# Patient Record
Sex: MALE | Race: WHITE
[De-identification: names, ages, dates, MRNs, and addresses within clinical notes are randomized per-mention and may not be internally consistent; named-entity substitution may affect disease eponyms.]

---

## 2023-08-09 ENCOUNTER — HOSPITAL ENCOUNTER (OUTPATIENT)
Dept: HOSPITAL 33 - ED | Age: 53
Setting detail: OBSERVATION
LOS: 2 days | Discharge: HOME | End: 2023-08-11
Attending: INTERNAL MEDICINE | Admitting: INTERNAL MEDICINE
Payer: COMMERCIAL

## 2023-08-09 DIAGNOSIS — R74.01: ICD-10-CM

## 2023-08-09 DIAGNOSIS — E87.6: ICD-10-CM

## 2023-08-09 DIAGNOSIS — R11.2: ICD-10-CM

## 2023-08-09 DIAGNOSIS — Z20.828: ICD-10-CM

## 2023-08-09 DIAGNOSIS — F32.A: ICD-10-CM

## 2023-08-09 DIAGNOSIS — F10.939: Primary | ICD-10-CM

## 2023-08-09 LAB
ALBUMIN SERPL-MCNC: 4.2 G/DL (ref 3.5–5)
ALP SERPL-CCNC: 105 U/L (ref 38–126)
ALT SERPL-CCNC: 258 U/L (ref 0–50)
AMPHETAMINES UR QL: NEGATIVE
ANION GAP SERPL CALC-SCNC: 15.8 MEQ/L (ref 5–15)
AST SERPL QL: 270 U/L (ref 17–59)
BACTERIA UR CULT: YES
BARBITURATES UR QL: NEGATIVE
BASOPHILS # BLD AUTO: 0.03 X10^3/UL (ref 0–0.4)
BASOPHILS NFR BLD AUTO: 0.3 % (ref 0–0.4)
BENZODIAZ UR QL SCN: NEGATIVE
BILIRUB BLD-MCNC: 1.6 MG/DL (ref 0.2–1.3)
BUN SERPL-MCNC: 11 MG/DL (ref 9–20)
CALCIUM SPEC-MCNC: 8.5 MG/DL (ref 8.4–10.2)
CHLORIDE SERPL-SCNC: 96 MMOL/L (ref 98–107)
CO2 SERPL-SCNC: 29 MMOL/L (ref 22–30)
COCAINE UR QL SCN: NEGATIVE
CREAT SERPL-MCNC: 1.01 MG/DL (ref 0.66–1.25)
EOSINOPHIL # BLD AUTO: 0 X10^3/UL (ref 0–0.5)
ETHANOL SERPL-MCNC: < 10 MG/DL (ref 0–10)
GFR SERPLBLD BASED ON 1.73 SQ M-ARVRAT: > 60 ML/MIN
GLUCOSE SERPL-MCNC: 159 MG/DL (ref 74–106)
HCT VFR BLD AUTO: 48.6 % (ref 42–50)
HGB BLD-MCNC: 17.2 G/DL (ref 12.5–18)
IMM GRANULOCYTES # BLD: 0.03 X10^3U/L (ref 0–0.03)
IMM GRANULOCYTES NFR BLD: 0.3 % (ref 0–0.4)
LIPASE SERPL-CCNC: 96 U/L (ref 23–300)
LYMPHOCYTES # SPEC AUTO: 1.73 X10^3/UL (ref 1–4.6)
MCH RBC QN AUTO: 32.4 PG (ref 26–32)
MCHC RBC AUTO-ENTMCNC: 35.4 G/DL (ref 32–36)
METHADONE UR QL: NEGATIVE
MONOCYTES # BLD AUTO: 1.34 X10^3/UL (ref 0–1.3)
NRBC # BLD AUTO: 0 X10^3U/L (ref 0–0.01)
NRBC BLD AUTO-RTO: 0 % (ref 0–0.1)
OPIATES UR QL: NEGATIVE
PCP UR QL CFM>20 NG/ML: NEGATIVE
PLATELET # BLD AUTO: 212 X10^3/UL (ref 150–450)
POTASSIUM SERPLBLD-SCNC: 3.2 MMOL/L (ref 3.5–5.1)
PROT SERPL-MCNC: 7.6 G/DL (ref 6.3–8.2)
PROT UR STRIP-MCNC: 100 MG/DL
RBC # BLD AUTO: 5.31 X10^6/UL (ref 4.1–5.6)
RBC # URNS HPF: (no result) /HPF (ref 0–5)
SODIUM SERPL-SCNC: 137 MMOL/L (ref 137–145)
THC UR QL SCN: POSITIVE
WBC # BLD AUTO: 10 X10^3/UL (ref 4–10.5)
WBC URNS QL MICRO: (no result) /HPF (ref 0–5)

## 2023-08-09 PROCEDURE — 81001 URINALYSIS AUTO W/SCOPE: CPT

## 2023-08-09 PROCEDURE — 96374 THER/PROPH/DIAG INJ IV PUSH: CPT

## 2023-08-09 PROCEDURE — 96375 TX/PRO/DX INJ NEW DRUG ADDON: CPT

## 2023-08-09 PROCEDURE — 36415 COLL VENOUS BLD VENIPUNCTURE: CPT

## 2023-08-09 PROCEDURE — 84484 ASSAY OF TROPONIN QUANT: CPT

## 2023-08-09 PROCEDURE — 76705 ECHO EXAM OF ABDOMEN: CPT

## 2023-08-09 PROCEDURE — 85025 COMPLETE CBC W/AUTO DIFF WBC: CPT

## 2023-08-09 PROCEDURE — 36000 PLACE NEEDLE IN VEIN: CPT

## 2023-08-09 PROCEDURE — 87086 URINE CULTURE/COLONY COUNT: CPT

## 2023-08-09 PROCEDURE — 87045 FECES CULTURE AEROBIC BACT: CPT

## 2023-08-09 PROCEDURE — 94760 N-INVAS EAR/PLS OXIMETRY 1: CPT

## 2023-08-09 PROCEDURE — 96361 HYDRATE IV INFUSION ADD-ON: CPT

## 2023-08-09 PROCEDURE — G0378 HOSPITAL OBSERVATION PER HR: HCPCS

## 2023-08-09 PROCEDURE — 87046 STOOL CULTR AEROBIC BACT EA: CPT

## 2023-08-09 PROCEDURE — 80053 COMPREHEN METABOLIC PANEL: CPT

## 2023-08-09 PROCEDURE — 94762 N-INVAS EAR/PLS OXIMTRY CONT: CPT

## 2023-08-09 PROCEDURE — 96360 HYDRATION IV INFUSION INIT: CPT

## 2023-08-09 PROCEDURE — 80307 DRUG TEST PRSMV CHEM ANLYZR: CPT

## 2023-08-09 PROCEDURE — 93041 RHYTHM ECG TRACING: CPT

## 2023-08-09 PROCEDURE — 96365 THER/PROPH/DIAG IV INF INIT: CPT

## 2023-08-09 PROCEDURE — 96368 THER/DIAG CONCURRENT INF: CPT

## 2023-08-09 PROCEDURE — 99285 EMERGENCY DEPT VISIT HI MDM: CPT

## 2023-08-09 PROCEDURE — 83690 ASSAY OF LIPASE: CPT

## 2023-08-09 PROCEDURE — 96366 THER/PROPH/DIAG IV INF ADDON: CPT

## 2023-08-09 PROCEDURE — 82077 ASSAY SPEC XCP UR&BREATH IA: CPT

## 2023-08-09 PROCEDURE — 74176 CT ABD & PELVIS W/O CONTRAST: CPT

## 2023-08-09 PROCEDURE — 83735 ASSAY OF MAGNESIUM: CPT

## 2023-08-09 RX ADMIN — MAGNESIUM SULFATE IN DEXTROSE SCH MLS/HR: 10 INJECTION, SOLUTION INTRAVENOUS at 22:37

## 2023-08-09 RX ADMIN — MAGNESIUM SULFATE IN DEXTROSE SCH MLS/HR: 10 INJECTION, SOLUTION INTRAVENOUS at 21:41

## 2023-08-09 RX ADMIN — POTASSIUM CHLORIDE SCH MLS/HR: 14.9 INJECTION, SOLUTION INTRAVENOUS at 21:41
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
## 2023-08-10 LAB
ALBUMIN SERPL-MCNC: 3 G/DL (ref 3.5–5)
ALP SERPL-CCNC: 78 U/L (ref 38–126)
ALT SERPL-CCNC: 190 U/L (ref 0–50)
ANION GAP SERPL CALC-SCNC: 8.2 MEQ/L (ref 5–15)
AST SERPL QL: 158 U/L (ref 17–59)
BASOPHILS # BLD AUTO: 0.04 X10^3/UL (ref 0–0.4)
BASOPHILS NFR BLD AUTO: 0.5 % (ref 0–0.4)
BILIRUB BLD-MCNC: 1.8 MG/DL (ref 0.2–1.3)
BUN SERPL-MCNC: 9 MG/DL (ref 9–20)
CALCIUM SPEC-MCNC: 7.8 MG/DL (ref 8.4–10.2)
CHLORIDE SERPL-SCNC: 100 MMOL/L (ref 98–107)
CO2 SERPL-SCNC: 31 MMOL/L (ref 22–30)
CREAT SERPL-MCNC: 1.04 MG/DL (ref 0.66–1.25)
EOSINOPHIL # BLD AUTO: 0.07 X10^3/UL (ref 0–0.5)
GFR SERPLBLD BASED ON 1.73 SQ M-ARVRAT: > 60 ML/MIN
GLUCOSE SERPL-MCNC: 109 MG/DL (ref 74–106)
HCT VFR BLD AUTO: 43 % (ref 42–50)
HGB BLD-MCNC: 14.6 G/DL (ref 12.5–18)
IMM GRANULOCYTES # BLD: 0.02 X10^3U/L (ref 0–0.03)
IMM GRANULOCYTES NFR BLD: 0.3 % (ref 0–0.4)
LYMPHOCYTES # SPEC AUTO: 2.32 X10^3/UL (ref 1–4.6)
MAGNESIUM SERPL-MCNC: 2 MG/DL (ref 1.6–2.3)
MCH RBC QN AUTO: 32.2 PG (ref 26–32)
MCHC RBC AUTO-ENTMCNC: 34 G/DL (ref 32–36)
MONOCYTES # BLD AUTO: 1.25 X10^3/UL (ref 0–1.3)
NRBC # BLD AUTO: 0 X10^3U/L (ref 0–0.01)
NRBC BLD AUTO-RTO: 0 % (ref 0–0.1)
PLATELET # BLD AUTO: 168 X10^3/UL (ref 150–450)
POTASSIUM SERPLBLD-SCNC: 3.5 MMOL/L (ref 3.5–5.1)
PROT SERPL-MCNC: 5.9 G/DL (ref 6.3–8.2)
RBC # BLD AUTO: 4.53 X10^6/UL (ref 4.1–5.6)
SODIUM SERPL-SCNC: 136 MMOL/L (ref 137–145)
WBC # BLD AUTO: 7.9 X10^3/UL (ref 4–10.5)

## 2023-08-10 RX ADMIN — LORAZEPAM PRN MG: 2 INJECTION, SOLUTION INTRAMUSCULAR; INTRAVENOUS at 07:25

## 2023-08-10 RX ADMIN — LORAZEPAM PRN MG: 2 INJECTION, SOLUTION INTRAMUSCULAR; INTRAVENOUS at 20:27

## 2023-08-10 RX ADMIN — LORAZEPAM PRN MG: 2 INJECTION, SOLUTION INTRAMUSCULAR; INTRAVENOUS at 13:46

## 2023-08-10 RX ADMIN — PANTOPRAZOLE SODIUM SCH MG: 40 TABLET, DELAYED RELEASE ORAL at 09:45

## 2023-08-10 RX ADMIN — FOLIC ACID SCH MG: 1 TABLET ORAL at 09:45

## 2023-08-10 RX ADMIN — ENOXAPARIN SODIUM SCH: 100 INJECTION SUBCUTANEOUS at 11:40

## 2023-08-10 RX ADMIN — LORAZEPAM PRN MG: 2 INJECTION, SOLUTION INTRAMUSCULAR; INTRAVENOUS at 16:05

## 2023-08-10 RX ADMIN — LORAZEPAM PRN MG: 2 INJECTION, SOLUTION INTRAMUSCULAR; INTRAVENOUS at 18:14

## 2023-08-10 RX ADMIN — THERA TABS SCH TAB: TAB at 09:45

## 2023-08-10 RX ADMIN — POTASSIUM CHLORIDE SCH MLS/HR: 14.9 INJECTION, SOLUTION INTRAVENOUS at 00:28

## 2023-08-10 RX ADMIN — Medication SCH MG: at 09:45

## 2023-08-10 RX ADMIN — LORAZEPAM PRN MG: 2 INJECTION, SOLUTION INTRAMUSCULAR; INTRAVENOUS at 00:44

## 2023-08-10 NOTE — XRAY
Indication: Abdomen pain and vomiting.



Multiple contiguous axial images obtained through the abdomen and pelvis

without contrast.



Comparison: None



Lung bases clear.  Heart not enlarged.



Noncontrasted stomach and bowel loops appear nonobstructed with normal

appendix.  Colonic diarrhea in sigmoid/rectum.  Gallbladder mildly distended

without obvious gallstones or biliary distention.  Nonobstructing punctate

calculus in each kidney.  No free fluid/air.  Incidental hepatic/splenic

calcified granulomas.



Remaining liver, gallbladder, pancreas, spleen, adrenal glands, kidneys,

ureters, bladder, and aorta are unremarkable for noncontrast exam.



Osseous structures intact with lumbosacral junction degenerative disc disease.



Impression:

1.  Mildly distended gallbladder.  Sonogram may yield further information if

clinically warranted.

2.  Nonobstructing punctate renal calculus bilaterally.

3.  Incidental old granulomatous disease.

## 2023-08-10 NOTE — PCM.NOTE
Date and Time: 08/10/23  1643





Subjective Assessment: 


 is a 53 year old male who presentd to ED with concerns of alcohol 

withdrawal. Over the past five years he has been intermittently binge drinking 

(drinks a fifth of liquor daily), with most recent binging being over the last 

few weeks. His last drink was at approximately 18:00 on the night prior to 

presenting. Since then, he has had nausea and nonbilious nonbloody emesis x 20 

episodes daily and generalized abdominal pain. He also has experienced nonbloody

diarrhea. He is interested in permanently quitting alcohol abuse. Upon interview

today patient without tremors or notable withdrawal symptoms. He states that he 

is feeling better today although he still is having loose stools and one episode

of vomiting. He is interested in outpatient treatment at [facility] for his

alcohol abuse.








- Review of Systems


Constitutional: No Symptoms


Eyes: No Symptoms


Ears, Nose, & Throat: No Symptoms


Respiratory: No Symptoms


Cardiac: No Symptoms


Abdominal/Gastrointestinal: Nausea, Vomiting, Diarrhea


Genitourinary Symptoms: No Symptoms


Musculoskeletal: No Symptoms


Skin: No Symptoms


Neurological: No Symptoms


Psychological: Alcohol Abuse, Depression





Objective Exam


General Appearance: no apparent distress


Neurologic Exam: alert, oriented x 3, cooperative, normal mood/affect


Skin Exam: normal color


Eye Exam: PERRL


Respiratory Exam: normal breath sounds


Cardiovascular Exam: regular rate/rhythm, normal heart sounds


Gastrointestinal/Abdomen Exam: soft, other (hyperacitve bowel sounds x 4 quads)


Extremity Exam: normal inspection





OBJECTIVE DATA


Vital Signs: 





                               Vital Signs - 24 hr











  Temp Pulse Resp BP Pulse Ox


 


 08/10/23 16:00    21  


 


 08/10/23 11:54  97.0 F  59 L  16  163/88  97


 


 08/10/23 11:43   72  18  


 


 08/10/23 07:20  97.9 F  84  18  141/91  95


 


 08/10/23 07:05      95


 


 08/10/23 06:03   59 L  18   97


 


 08/10/23 03:48  98 F  57 L  19  139/96  97


 


 08/10/23 03:43   57 L  19  


 


 08/10/23 03:08   57 L   


 


 08/09/23 23:56  98.0 F  60  16  124/97  97


 


 08/09/23 23:32      97


 


 08/09/23 23:00   79  18  130/82  97


 


 08/09/23 22:00   68  18  136/93  98


 


 08/09/23 21:29      97


 


 08/09/23 21:00   73  18  140/84  97


 


 08/09/23 20:00   78  18  138/88  97


 


 08/09/23 19:54      98


 


 08/09/23 19:00   104 H  22  135/97  98


 


 08/09/23 18:25  98.3 F  78  16  145/99  97








                        Pain Assessment - Last Documented











Pain Intensity                 0











Intake and Output: 





                                 Intake & Output











 08/08/23 08/09/23 08/10/23 08/11/23





 11:59 11:59 11:59 11:59


 


Intake Total   250 


 


Balance   250 


 


Weight   95.7 kg 











Lab Results: 





                            Lab Results-Last 24 Hours











  08/09/23 08/09/23 08/09/23 Range/Units





  19:00 19:00 19:00 


 


WBC  10.0    (4.0-10.5)  x10^3/uL


 


RBC  5.31    (4.1-5.6)  x10^6/uL


 


Hgb  17.2    (12.5-18.0)  g/dL


 


Hct  48.6    (42-50)  %


 


MCV  91.5    ()  fL


 


MCH  32.4 H    (26-32)  pg


 


MCHC  35.4    (32-36)  g/dL


 


RDW  13.0    (11.5-14.0)  %


 


Plt Count  212    (150-450)  x10^3/uL


 


MPV  11.1 H    (7.5-11.0)  fL


 


Gran %  68.9 H    (36.0-66.0)  %


 


Immature Gran % (Auto)  0.3    (0.00-0.4)  %


 


Nucleat RBC Rel Count  0.0    (0.00-0.1)  %


 


Eos # (Auto)  0    (0-0.5)  x10^3/uL


 


Immature Gran # (Auto)  0.03    (0.00-0.03)  x10^3u/L


 


Absolute Lymphs (auto)  1.73    (1.0-4.6)  x10^3/uL


 


Absolute Monos (auto)  1.34 H    (0.0-1.3)  x10^3/uL


 


Absolute Nucleated RBC  0.00    (0.00-0.01)  x10^3u/L


 


Lymphocytes %  17.2 L    (24.0-44.0)  %


 


Monocytes %  13.3 H    (0.0-12.0)  %


 


Eosinophils %  0.0    (0.00-5.0)  %


 


Basophils %  0.3    (0.0-0.4)  %


 


Absolute Granulocytes  6.91 H    (1.4-6.9)  x10^3/uL


 


Basophils #  0.03    (0-0.4)  x10^3/uL


 


Sodium   137   (137-145)  mmol/L


 


Potassium   3.2 L   (3.5-5.1)  mmol/L


 


Chloride   96 L   ()  mmol/L


 


Carbon Dioxide   29   (22-30)  mmol/L


 


Anion Gap   15.8 H   (5-15)  MEQ/L


 


BUN   11   (9-20)  mg/dL


 


Creatinine   1.01   (0.66-1.25)  mg/dL


 


Estimated GFR   > 60.0   ML/MIN


 


Glucose   159 H   ()  mg/dL


 


Calcium   8.5   (8.4-10.2)  mg/dL


 


Magnesium     (1.6-2.3)  mg/dL


 


Total Bilirubin   1.60 H   (0.2-1.3)  mg/dL


 


AST   270 H   (17-59)  U/L


 


ALT   258 H   (0-50)  U/L


 


Alkaline Phosphatase   105   ()  U/L


 


Troponin I    < 0.012  (0.000-0.034)  ng/mL


 


Serum Total Protein   7.6   (6.3-8.2)  g/dL


 


Albumin   4.2   (3.5-5.0)  g/dL


 


Lipase   96   ()  U/L


 


Urine Color     (Yellow)  


 


Urine Appearance     (Clear)  


 


Urine pH     (4.6-8.0)  


 


Ur Specific Gravity     (1.005-1.030)  


 


Urine Protein     (Negative)  


 


Urine Glucose (UA)     (Negative)  mg/dL


 


Urine Ketones     (Negative)  


 


Urine Blood     (Negative)  


 


Urine Nitrite     (Negative)  


 


Urine Bilirubin     (Negative)  


 


Urine Urobilinogen     (0.2)  mg/dL


 


Ur Leukocyte Esterase     (Negative)  


 


U Hyaline Cast (Auto)     (0-2)  /LPF


 


Urine Microscopic RBC     (0-5)  /HPF


 


Urine Microscopic WBC     (0-5)  /HPF


 


Ur Epithelial Cells     (None Seen)  /HPF


 


Urine Bacteria     (None Seen)  /HPF


 


Urine Culture Reflexed     (NO)  


 


Urine Opiates Level     (NEGATIVE)  


 


Ur Methadone     (NEGATIVE)  


 


Urine Barbiturates     (NEGATIVE)  


 


Ur Phencyclidine (PCP)     (NEGATIVE)  


 


Urine Amphetamine     (NEGATIVE)  


 


U Benzodiazepine Level     (NEGATIVE)  


 


Urine Cocaine     (NEGATIVE)  


 


Urine Marijuana (THC)     (NEGATIVE)  


 


Ethyl Alcohol   < 10   (0-10)  mg/dL














  08/09/23 08/09/23 08/09/23 Range/Units





  19:21 19:21 23:53 


 


WBC     (4.0-10.5)  x10^3/uL


 


RBC     (4.1-5.6)  x10^6/uL


 


Hgb     (12.5-18.0)  g/dL


 


Hct     (42-50)  %


 


MCV     ()  fL


 


MCH     (26-32)  pg


 


MCHC     (32-36)  g/dL


 


RDW     (11.5-14.0)  %


 


Plt Count     (150-450)  x10^3/uL


 


MPV     (7.5-11.0)  fL


 


Gran %     (36.0-66.0)  %


 


Immature Gran % (Auto)     (0.00-0.4)  %


 


Nucleat RBC Rel Count     (0.00-0.1)  %


 


Eos # (Auto)     (0-0.5)  x10^3/uL


 


Immature Gran # (Auto)     (0.00-0.03)  x10^3u/L


 


Absolute Lymphs (auto)     (1.0-4.6)  x10^3/uL


 


Absolute Monos (auto)     (0.0-1.3)  x10^3/uL


 


Absolute Nucleated RBC     (0.00-0.01)  x10^3u/L


 


Lymphocytes %     (24.0-44.0)  %


 


Monocytes %     (0.0-12.0)  %


 


Eosinophils %     (0.00-5.0)  %


 


Basophils %     (0.0-0.4)  %


 


Absolute Granulocytes     (1.4-6.9)  x10^3/uL


 


Basophils #     (0-0.4)  x10^3/uL


 


Sodium     (137-145)  mmol/L


 


Potassium     (3.5-5.1)  mmol/L


 


Chloride     ()  mmol/L


 


Carbon Dioxide     (22-30)  mmol/L


 


Anion Gap     (5-15)  MEQ/L


 


BUN     (9-20)  mg/dL


 


Creatinine     (0.66-1.25)  mg/dL


 


Estimated GFR     ML/MIN


 


Glucose     ()  mg/dL


 


Calcium     (8.4-10.2)  mg/dL


 


Magnesium     (1.6-2.3)  mg/dL


 


Total Bilirubin     (0.2-1.3)  mg/dL


 


AST     (17-59)  U/L


 


ALT     (0-50)  U/L


 


Alkaline Phosphatase     ()  U/L


 


Troponin I    < 0.012  (0.000-0.034)  ng/mL


 


Serum Total Protein     (6.3-8.2)  g/dL


 


Albumin     (3.5-5.0)  g/dL


 


Lipase     ()  U/L


 


Urine Color  Dark Yellow    (Yellow)  


 


Urine Appearance  Clear    (Clear)  


 


Urine pH  6.5    (4.6-8.0)  


 


Ur Specific Gravity  >=1.030 A    (1.005-1.030)  


 


Urine Protein  100 A    (Negative)  


 


Urine Glucose (UA)  Negative    (Negative)  mg/dL


 


Urine Ketones  Trace A    (Negative)  


 


Urine Blood  Negative    (Negative)  


 


Urine Nitrite  Negative    (Negative)  


 


Urine Bilirubin  Small A    (Negative)  


 


Urine Urobilinogen  1.0 A    (0.2)  mg/dL


 


Ur Leukocyte Esterase  Trace A    (Negative)  


 


U Hyaline Cast (Auto)  3-5 A    (0-2)  /LPF


 


Urine Microscopic RBC  0-2    (0-5)  /HPF


 


Urine Microscopic WBC  3-5    (0-5)  /HPF


 


Ur Epithelial Cells  None Seen    (None Seen)  /HPF


 


Urine Bacteria  None Seen    (None Seen)  /HPF


 


Urine Culture Reflexed  YES    (NO)  


 


Urine Opiates Level   NEGATIVE   (NEGATIVE)  


 


Ur Methadone   NEGATIVE   (NEGATIVE)  


 


Urine Barbiturates   NEGATIVE   (NEGATIVE)  


 


Ur Phencyclidine (PCP)   NEGATIVE   (NEGATIVE)  


 


Urine Amphetamine   NEGATIVE   (NEGATIVE)  


 


U Benzodiazepine Level   NEGATIVE   (NEGATIVE)  


 


Urine Cocaine   NEGATIVE   (NEGATIVE)  


 


Urine Marijuana (THC)   POSITIVE   (NEGATIVE)  


 


Ethyl Alcohol     (0-10)  mg/dL














  08/10/23 08/10/23 08/10/23 Range/Units





  03:48 03:48 03:48 


 


WBC   7.9   (4.0-10.5)  x10^3/uL


 


RBC   4.53   (4.1-5.6)  x10^6/uL


 


Hgb   14.6   (12.5-18.0)  g/dL


 


Hct   43.0   (42-50)  %


 


MCV   94.9   ()  fL


 


MCH   32.2 H   (26-32)  pg


 


MCHC   34.0   (32-36)  g/dL


 


RDW   12.9   (11.5-14.0)  %


 


Plt Count   168   (150-450)  x10^3/uL


 


MPV   11.1 H   (7.5-11.0)  fL


 


Gran %   53.1   (36.0-66.0)  %


 


Immature Gran % (Auto)   0.3   (0.00-0.4)  %


 


Nucleat RBC Rel Count   0.0   (0.00-0.1)  %


 


Eos # (Auto)   0.07   (0-0.5)  x10^3/uL


 


Immature Gran # (Auto)   0.02   (0.00-0.03)  x10^3u/L


 


Absolute Lymphs (auto)   2.32   (1.0-4.6)  x10^3/uL


 


Absolute Monos (auto)   1.25   (0.0-1.3)  x10^3/uL


 


Absolute Nucleated RBC   0.00   (0.00-0.01)  x10^3u/L


 


Lymphocytes %   29.4   (24.0-44.0)  %


 


Monocytes %   15.8 H   (0.0-12.0)  %


 


Eosinophils %   0.9   (0.00-5.0)  %


 


Basophils %   0.5   (0.0-0.4)  %


 


Absolute Granulocytes   4.20   (1.4-6.9)  x10^3/uL


 


Basophils #   0.04   (0-0.4)  x10^3/uL


 


Sodium    136 L  (137-145)  mmol/L


 


Potassium    3.5  (3.5-5.1)  mmol/L


 


Chloride    100  ()  mmol/L


 


Carbon Dioxide    31 H  (22-30)  mmol/L


 


Anion Gap    8.2  (5-15)  MEQ/L


 


BUN    9  (9-20)  mg/dL


 


Creatinine    1.04  (0.66-1.25)  mg/dL


 


Estimated GFR    > 60.0  ML/MIN


 


Glucose    109 H  ()  mg/dL


 


Calcium    7.8 L  (8.4-10.2)  mg/dL


 


Magnesium    2.0  (1.6-2.3)  mg/dL


 


Total Bilirubin    1.80 H  (0.2-1.3)  mg/dL


 


AST    158 H  (17-59)  U/L


 


ALT    190 H  (0-50)  U/L


 


Alkaline Phosphatase    78  ()  U/L


 


Troponin I  < 0.012    (0.000-0.034)  ng/mL


 


Serum Total Protein    5.9 L  (6.3-8.2)  g/dL


 


Albumin    3.0 L  (3.5-5.0)  g/dL


 


Lipase     ()  U/L


 


Urine Color     (Yellow)  


 


Urine Appearance     (Clear)  


 


Urine pH     (4.6-8.0)  


 


Ur Specific Gravity     (1.005-1.030)  


 


Urine Protein     (Negative)  


 


Urine Glucose (UA)     (Negative)  mg/dL


 


Urine Ketones     (Negative)  


 


Urine Blood     (Negative)  


 


Urine Nitrite     (Negative)  


 


Urine Bilirubin     (Negative)  


 


Urine Urobilinogen     (0.2)  mg/dL


 


Ur Leukocyte Esterase     (Negative)  


 


U Hyaline Cast (Auto)     (0-2)  /LPF


 


Urine Microscopic RBC     (0-5)  /HPF


 


Urine Microscopic WBC     (0-5)  /HPF


 


Ur Epithelial Cells     (None Seen)  /HPF


 


Urine Bacteria     (None Seen)  /HPF


 


Urine Culture Reflexed     (NO)  


 


Urine Opiates Level     (NEGATIVE)  


 


Ur Methadone     (NEGATIVE)  


 


Urine Barbiturates     (NEGATIVE)  


 


Ur Phencyclidine (PCP)     (NEGATIVE)  


 


Urine Amphetamine     (NEGATIVE)  


 


U Benzodiazepine Level     (NEGATIVE)  


 


Urine Cocaine     (NEGATIVE)  


 


Urine Marijuana (THC)     (NEGATIVE)  


 


Ethyl Alcohol     (0-10)  mg/dL











Radiology Exams: 





                              Radiology Procedures











 Category Date Time Status


 


 ABDOMEN AND PELVIS W/0 CONTRAS [CT] Stat Exams  08/09/23 19:32 Completed














Assessment/Plan


(1) Alcohol withdrawal


Current Visit: Yes   Status: Acute   


Assessment & Plan: 


-Continue CIWA protocol, if continues to be without symptoms may discharge 

tomorrow


-Patient provided with information for outpatient treatment with [facility]


Code(s): F10.939 - ALCOHOL USE, UNSPECIFIED WITH WITHDRAWAL, UNSPECIFIED   





(2) Hypokalemia


Current Visit: Yes   Status: Acute   


Assessment & Plan: 


-Replenished with potassium chloride 20 meq IV, will continue to monitor and 

replace as appropriate, most likely secondary to N/V and poor oral intake 





Code(s): E87.6 - HYPOKALEMIA   





(3) Nausea and vomiting


Current Visit: Yes   Status: Acute   


Assessment & Plan: 


-Improved, secondary to alcohol abuse/withdrawal


-Zofran prn/ativan


-CT imaging reviewed Mildly distended gallbladder, nonobstructing punctate renal

 calculus bilaterally and incidental old granulomatous disease.


-Will order abdominal US for evaluation


Code(s): R11.2 - NAUSEA WITH VOMITING, UNSPECIFIED   





(4) Transaminitis


Current Visit: Yes   Status: Acute   


Assessment & Plan: 


Most likely secondary to alcohol use, will continue to monitor 


Code(s): R74.01 - ELEVATION OF LEVELS OF LIVER TRANSAMINASE LEVELS   





Telemedicine Encounter





- Telemedicine Encounter


Telemedicine Encounter: 


The entirety of this encounter was performed via Telemedicine"

## 2023-08-11 VITALS
HEART RATE: 47 BPM | SYSTOLIC BLOOD PRESSURE: 151 MMHG | DIASTOLIC BLOOD PRESSURE: 84 MMHG | TEMPERATURE: 97 F | OXYGEN SATURATION: 96 %

## 2023-08-11 VITALS — RESPIRATION RATE: 17 BRPM

## 2023-08-11 LAB
ALBUMIN SERPL-MCNC: 3.1 G/DL (ref 3.5–5)
ALP SERPL-CCNC: 69 U/L (ref 38–126)
ALT SERPL-CCNC: 162 U/L (ref 0–50)
ANION GAP SERPL CALC-SCNC: 8.7 MEQ/L (ref 5–15)
AST SERPL QL: 122 U/L (ref 17–59)
BILIRUB BLD-MCNC: 1.7 MG/DL (ref 0.2–1.3)
BUN SERPL-MCNC: 13 MG/DL (ref 9–20)
CALCIUM SPEC-MCNC: 8 MG/DL (ref 8.4–10.2)
CHLORIDE SERPL-SCNC: 103 MMOL/L (ref 98–107)
CO2 SERPL-SCNC: 26 MMOL/L (ref 22–30)
CREAT SERPL-MCNC: 1.19 MG/DL (ref 0.66–1.25)
GFR SERPLBLD BASED ON 1.73 SQ M-ARVRAT: > 60 ML/MIN
GLUCOSE SERPL-MCNC: 138 MG/DL (ref 74–106)
POTASSIUM SERPLBLD-SCNC: 4 MMOL/L (ref 3.5–5.1)
PROT SERPL-MCNC: 5.9 G/DL (ref 6.3–8.2)
SODIUM SERPL-SCNC: 134 MMOL/L (ref 137–145)

## 2023-08-11 RX ADMIN — THERA TABS SCH TAB: TAB at 10:30

## 2023-08-11 RX ADMIN — Medication SCH MG: at 10:31

## 2023-08-11 RX ADMIN — FOLIC ACID SCH MG: 1 TABLET ORAL at 10:31

## 2023-08-11 RX ADMIN — LORAZEPAM PRN MG: 2 INJECTION, SOLUTION INTRAMUSCULAR; INTRAVENOUS at 02:26

## 2023-08-11 RX ADMIN — PANTOPRAZOLE SODIUM SCH MG: 40 TABLET, DELAYED RELEASE ORAL at 10:30

## 2023-08-11 RX ADMIN — ENOXAPARIN SODIUM SCH: 100 INJECTION SUBCUTANEOUS at 10:35

## 2023-08-11 RX ADMIN — LORAZEPAM PRN MG: 2 INJECTION, SOLUTION INTRAMUSCULAR; INTRAVENOUS at 09:52

## 2023-08-11 NOTE — PCM.DS
Discharge Summary


Date of Admission: 


08/09/23 23:25





Date of Discharge: 





8/11/23


Admitting Physician: 


CLEO MCKEE MD





Primary Care Provider: 


NO FAMILY DOCTOR








<PUJA KIRKLAND - Last Filed: 08/11/23 12:27>


Date of Admission: 


08/09/23 23:25





Admitting Physician: 


CLEO MCKEE MD





Primary Care Provider: 


NO FAMILY DOCTOR








<EKTA JEFFRIES - Last Filed: 08/11/23 17:33>





Allergies





<PUJA KIRKLAND - Last Filed: 08/11/23 12:27>





<EKTA JEFFRIES - Last Filed: 08/11/23 17:33>


Allergies





No Known Drug Allergies Allergy (Verified 08/09/23 19:19)


   











Hospital Summary





- Hospital Course


Hospital Course: 





Mr. Engel is a 53 yeal old male admitted with alcohol withdrawal and alcohol 

hepatitis. During hospital course patient CIWA protocol was initiated. Patient 

received three doses of ativan, he is no longer with any withdrawal symptoms 

demonstrated on arrival. CT imaging showed a mildly distended gallbladder but 

unremarkable on abdominal us. Transaminases are improving. Patient is interested

in alcohol treatment program and has been provided resources for [facility]. 





(1) Alcohol withdrawal


Current Visit: Yes   Status: Acute   


Assessment & Plan: 


-Continue CIWA protocol, if continues to be without symptoms may discharge 

tomorrow


-Patient provided with information for outpatient treatment with [facility]


Code(s): F10.939 - ALCOHOL USE, UNSPECIFIED WITH WITHDRAWAL, UNSPECIFIED   





(2) Hypokalemia


Current Visit: Yes   Status: Acute   


Assessment & Plan: 


-Replenished with potassium chloride 20 meq IV, will continue to monitor and 

replace as appropriate, most likely secondary to N/V and poor oral intake 


Resolved





Code(s): E87.6 - HYPOKALEMIA   





(3) Nausea and vomiting


Current Visit: Yes   Status: Acute   


Assessment & Plan: 


-Improved, secondary to alcohol abuse/withdrawal


-Zofran prn/ativan


-CT imaging reviewed Mildly distended gallbladder, nonobstructing punctate renal

calculus bilaterally and incidental old granulomatous disease.


-Will order abdominal US for evaluation


-Resolved, US unremarkable


Code(s): R11.2 - NAUSEA WITH VOMITING, UNSPECIFIED   





(4) Transaminitis


Current Visit: Yes   Status: Acute   


Assessment & Plan: 


Most likely secondary to alcohol use, will continue to monitor, improvement 

noted on LFT's


Code(s): R74.01 - ELEVATION OF LEVELS OF LIVER TRANSAMINASE LEVELS   








I have spent > 45 minutes planning and coordinating safe discharge of this 

patient.





- Vitals & Intake/Output


Vital Signs: 





                                   Vital Signs











Temperature  97.0 F   08/11/23 11:57


 


Pulse Rate  47 L  08/11/23 11:57


 


Respiratory Rate  17   08/11/23 11:57


 


Blood Pressure  151/84   08/11/23 11:57


 


O2 Sat by Pulse Oximetry  96   08/11/23 11:57











Intake & Output: 





                                 Intake & Output











 08/09/23 08/10/23 08/11/23 08/12/23





 11:59 11:59 11:59 11:59


 


Intake Total  250 720 


 


Balance  250 720 


 


Weight  95.7 kg 95.5 kg 














- Lab


Result Diagrams: 


                                 08/10/23 03:48





                                 08/11/23 04:26


Lab Results-Last 24 Hrs: 





                            Lab Results-Last 24 Hours











  08/11/23 Range/Units





  04:26 


 


Sodium  134 L  (137-145)  mmol/L


 


Potassium  4.0  (3.5-5.1)  mmol/L


 


Chloride  103  ()  mmol/L


 


Carbon Dioxide  26  (22-30)  mmol/L


 


Anion Gap  8.7  (5-15)  MEQ/L


 


BUN  13  (9-20)  mg/dL


 


Creatinine  1.19  (0.66-1.25)  mg/dL


 


Estimated GFR  > 60.0  ML/MIN


 


Glucose  138 H  ()  mg/dL


 


Calcium  8.0 L  (8.4-10.2)  mg/dL


 


Total Bilirubin  1.70 H  (0.2-1.3)  mg/dL


 


AST  122 H  (17-59)  U/L


 


ALT  162 H  (0-50)  U/L


 


Alkaline Phosphatase  69  ()  U/L


 


Serum Total Protein  5.9 L  (6.3-8.2)  g/dL


 


Albumin  3.1 L  (3.5-5.0)  g/dL











Micro Results-Entire Visit: 





                                  Microbiology











 08/09/23 19:21 Urine Culture - Final





 Urine, Void    NO GROWTH














- Radiology Exams


Ordered Rad Exams-Entire Visit: 





                              Radiology Procedures











 Category Date Time Status


 


 ABDOMEN AND PELVIS W/0 CONTRAS [CT] Stat Exams  08/09/23 19:32 Completed


 


 ABDOMINAL-LIMITED [US] Routine Exams  08/11/23 07:00 Completed














<PUJA KIRKLAND - Last Filed: 08/11/23 12:27>





- Vitals & Intake/Output


Vital Signs: 





                                   Vital Signs











Temperature  97.0 F   08/11/23 11:57


 


Pulse Rate  47 L  08/11/23 11:57


 


Respiratory Rate  17   08/11/23 11:57


 


Blood Pressure  151/84   08/11/23 11:57


 


O2 Sat by Pulse Oximetry  96   08/11/23 11:57











Intake & Output: 





                                 Intake & Output











 08/09/23 08/10/23 08/11/23 08/12/23





 11:59 11:59 11:59 11:59


 


Intake Total  250 720 


 


Balance  250 720 


 


Weight  95.7 kg 95.5 kg 














- Lab


Result Diagrams: 


                                 08/10/23 03:48





                                 08/11/23 04:26


Lab Results-Last 24 Hrs: 





                            Lab Results-Last 24 Hours











  08/11/23 Range/Units





  04:26 


 


Sodium  134 L  (137-145)  mmol/L


 


Potassium  4.0  (3.5-5.1)  mmol/L


 


Chloride  103  ()  mmol/L


 


Carbon Dioxide  26  (22-30)  mmol/L


 


Anion Gap  8.7  (5-15)  MEQ/L


 


BUN  13  (9-20)  mg/dL


 


Creatinine  1.19  (0.66-1.25)  mg/dL


 


Estimated GFR  > 60.0  ML/MIN


 


Glucose  138 H  ()  mg/dL


 


Calcium  8.0 L  (8.4-10.2)  mg/dL


 


Total Bilirubin  1.70 H  (0.2-1.3)  mg/dL


 


AST  122 H  (17-59)  U/L


 


ALT  162 H  (0-50)  U/L


 


Alkaline Phosphatase  69  ()  U/L


 


Serum Total Protein  5.9 L  (6.3-8.2)  g/dL


 


Albumin  3.1 L  (3.5-5.0)  g/dL











Micro Results-Entire Visit: 





                                  Microbiology











 08/09/23 19:21 Urine Culture - Final





 Urine, Void    NO GROWTH














- Radiology Exams


Ordered Rad Exams-Entire Visit: 





                              Radiology Procedures











 Category Date Time Status


 


 ABDOMEN AND PELVIS W/0 CONTRAS [CT] Stat Exams  08/09/23 19:32 Completed


 


 ABDOMINAL-LIMITED [US] Routine Exams  08/11/23 07:00 Completed














<EKTA JEFFRIES - Last Filed: 08/11/23 17:33>





Discharge Exam


General Appearance: no apparent distress


Neurologic Exam: alert, oriented x 3, cooperative


Eye Exam: PERRL


Ears, Nose, Throat Exam: normal ENT inspection


Neck Exam: normal inspection


Respiratory Exam: normal breath sounds


Cardiovascular Exam: regular rate/rhythm, normal heart sounds


Gastrointestinal/Abdomen Exam: soft, normal bowel sounds





<PUJA KIRKLAND - Last Filed: 08/11/23 12:27>





Final Diagnosis/Problem List





- Final Discharge Diagnosis/Problem


(1) Alcohol withdrawal


Status: Resolved   Code(s): F10.939 - ALCOHOL USE, UNSPECIFIED WITH WITHDRAWAL, 

UNSPECIFIED   





(2) Hypokalemia


Status: Resolved   Code(s): E87.6 - HYPOKALEMIA   





(3) Nausea and vomiting


Status: Resolved   Code(s): R11.2 - NAUSEA WITH VOMITING, UNSPECIFIED   





(4) Transaminitis


Status: Chronic   Code(s): R74.01 - ELEVATION OF LEVELS OF LIVER TRANSAMINASE 

LEVELS   





<PUJA KIRKLAND - Last Filed: 08/11/23 12:27>





Telemedicine Encounter





- Telemedicine Encounter


Telemedicine Encounter: 


The entirety of this encounter was performed via Telemedicine" 








<PUJA KIRKLAND - Last Filed: 08/11/23 12:27>





- Telemedicine Encounter


Telemedicine Encounter: 


I discussed care with Puja Kirkland NP and reviewed pertinent clinical data 

including history, physical, and diagnostic findings. I agree with the 

assessment, and treatment plan as described in her original note.  Please see 

below for my summary of findings and any additional assessment and plan, as well

 as any meaningful corrections or explanations of their note. My portion of 

visit was conducted entirely via telemedicine. 





Admitted with mild alcohol withdrawal. He was covered under [facility]'s protocol, but

 had only very minimal uses of Ativan. Evidence of alcoholic hepatitis, with 

labs improving prior to discharge. He had no further signs of alcohol 

withdrawal, and he was referred to outpatient alcohol treatment at his request 

at time of discharge.





Ekta Jeffries MD, PhD


Internal Medicine Hospitalist


[facility]











<EKTA JEFFRIES - Last Filed: 08/11/23 17:33>





<PUJA KIRKLAND - Last Filed: 08/11/23 12:27>





<EKTA JEFFRIES - Last Filed: 08/11/23 17:33>





- Discharge


Disposition: Home, Self-Care


Condition: Stable


Prescriptions: 


No Action


   No Reportable Medications [No Reported Medications] 


Instructions:  Alcohol Withdrawal (DC), Alcohol Use Disorder (DC)


Additional Instructions: 


YOU CAN REACH OUT TO THE [facility] [phone] TO MAKE AN APPOINTMENT 

FOR OUTPATIENT THERAPY. 


Follow up with: 


NIA MANZANARES MD [ACTIVE STAFF] - 08/17/23 2:15 pm ([place] Office )

## 2023-08-11 NOTE — XRAY
Indication: Abnormal distended gallbladder on recent CT.



Two-dimensional gallbladder sonogram performed.



Comparison: None



Pancreas obscured due to overlying bowel gas.  Gallbladder partially

contracted without gallstones.  Wall thickening up to 4.1 mm reasonably

explained by incomplete distention.  No pericholecystic fluid.  Common bile

duct measures 2.9 mm.  No intrahepatic biliary distention.  Remaining

visualized liver and right kidney are sonographically unremarkable.  Right

kidney measures 11.1 cm length.



Impression: Partially contracted gallbladder with wall thickening.  Negative

for cholelithiasis/acute cholecystitis.  Nonvisualization pancreas.

## 2025-02-24 ENCOUNTER — HOSPITAL ENCOUNTER (EMERGENCY)
Dept: HOSPITAL 33 - ED | Age: 55
Discharge: HOME | End: 2025-02-24
Payer: COMMERCIAL

## 2025-02-24 VITALS — OXYGEN SATURATION: 96 % | RESPIRATION RATE: 18 BRPM

## 2025-02-24 VITALS — DIASTOLIC BLOOD PRESSURE: 91 MMHG | SYSTOLIC BLOOD PRESSURE: 127 MMHG | HEART RATE: 80 BPM

## 2025-02-24 VITALS — TEMPERATURE: 97.2 F

## 2025-02-24 DIAGNOSIS — Z79.899: ICD-10-CM

## 2025-02-24 DIAGNOSIS — K85.90: ICD-10-CM

## 2025-02-24 DIAGNOSIS — K29.20: Primary | ICD-10-CM

## 2025-02-24 DIAGNOSIS — K20.90: ICD-10-CM

## 2025-02-24 DIAGNOSIS — R74.01: ICD-10-CM

## 2025-02-24 DIAGNOSIS — E86.0: ICD-10-CM

## 2025-02-24 LAB
ALBUMIN SERPL-MCNC: 4.2 G/DL (ref 3.5–5)
ALP SERPL-CCNC: 88 U/L (ref 38–126)
ALT SERPL-CCNC: 116 U/L (ref 0–50)
AMPHETAMINES UR QL: NEGATIVE
AMYLASE SERPL-CCNC: 187 U/L (ref 30–110)
ANION GAP SERPL CALC-SCNC: 21.7 MEQ/L (ref 5–15)
AST SERPL QL: 143 U/L (ref 17–59)
BARBITURATES UR QL: NEGATIVE
BENZODIAZ UR QL SCN: NEGATIVE
BILIRUB BLD-MCNC: 1.2 MG/DL (ref 0.2–1.3)
BUN SERPL-MCNC: 7 MG/DL (ref 9–20)
CALCIUM SPEC-MCNC: 8.4 MG/DL (ref 8.4–10.2)
CELLS COUNTED: 100
CHLORIDE SERPL-SCNC: 99 MMOL/L (ref 98–107)
CO2 SERPL-SCNC: 20 MMOL/L (ref 22–30)
COCAINE UR QL SCN: NEGATIVE
CREAT SERPL-MCNC: 0.97 MG/DL (ref 0.66–1.25)
ETHANOL SERPL-MCNC: 18 MG/DL (ref 0–10)
GFR SERPLBLD BASED ON 1.73 SQ M-ARVRAT: 93.4 ML/MIN
GLUCOSE SERPL-MCNC: 142 MG/DL (ref 74–106)
HCT VFR BLD AUTO: 44.7 % (ref 40.1–51)
HGB BLD-MCNC: 15.7 G/DL (ref 13.7–17.5)
LIPASE SERPL-CCNC: 82 U/L (ref 23–300)
MAGNESIUM SERPL-MCNC: 1.1 MG/DL (ref 1.6–2.3)
MANUAL DIF COMMENT BLD-IMP: NORMAL
MCH RBC QN AUTO: 31.7 PG (ref 25.7–32.2)
MCHC RBC AUTO-ENTMCNC: 35.1 G/DL (ref 32.3–36.5)
METHADONE UR QL: NEGATIVE
OPIATES UR QL: NEGATIVE
PCP UR QL CFM>20 NG/ML: NEGATIVE
PLATELET # BLD AUTO: 205 X10^3/UL (ref 163–337)
POTASSIUM SERPLBLD-SCNC: 3.2 MMOL/L (ref 3.5–5.1)
PROT SERPL-MCNC: 7.4 G/DL (ref 6.3–8.2)
RBC # BLD AUTO: 4.95 X10^6/UL (ref 4.63–6.08)
SODIUM SERPL-SCNC: 138 MMOL/L (ref 135–145)
THC UR QL SCN: POSITIVE
TOXIC GRANULES BLD QL SMEAR: (no result)
WBC # BLD AUTO: 14 X10^3/UL (ref 4.23–9.07)

## 2025-02-24 PROCEDURE — 99284 EMERGENCY DEPT VISIT MOD MDM: CPT

## 2025-02-24 PROCEDURE — 96367 TX/PROPH/DG ADDL SEQ IV INF: CPT

## 2025-02-24 PROCEDURE — 96374 THER/PROPH/DIAG INJ IV PUSH: CPT

## 2025-02-24 PROCEDURE — 80307 DRUG TEST PRSMV CHEM ANLYZR: CPT

## 2025-02-24 PROCEDURE — 96361 HYDRATE IV INFUSION ADD-ON: CPT

## 2025-02-24 PROCEDURE — 96375 TX/PRO/DX INJ NEW DRUG ADDON: CPT

## 2025-02-24 PROCEDURE — 96365 THER/PROPH/DIAG IV INF INIT: CPT

## 2025-02-24 PROCEDURE — 74177 CT ABD & PELVIS W/CONTRAST: CPT

## 2025-02-24 PROCEDURE — 82150 ASSAY OF AMYLASE: CPT

## 2025-02-24 PROCEDURE — 82077 ASSAY SPEC XCP UR&BREATH IA: CPT

## 2025-02-24 PROCEDURE — 83735 ASSAY OF MAGNESIUM: CPT

## 2025-02-24 PROCEDURE — 80053 COMPREHEN METABOLIC PANEL: CPT

## 2025-02-24 PROCEDURE — 83690 ASSAY OF LIPASE: CPT

## 2025-02-24 PROCEDURE — 99285 EMERGENCY DEPT VISIT HI MDM: CPT

## 2025-02-24 PROCEDURE — 36415 COLL VENOUS BLD VENIPUNCTURE: CPT

## 2025-02-24 PROCEDURE — 85025 COMPLETE CBC W/AUTO DIFF WBC: CPT

## 2025-02-24 RX ADMIN — PROCHLORPERAZINE EDISYLATE ONE MG: 5 INJECTION INTRAMUSCULAR; INTRAVENOUS at 07:41

## 2025-02-24 RX ADMIN — ONDANSETRON ONE: 2 INJECTION, SOLUTION INTRAMUSCULAR; INTRAVENOUS at 07:48

## 2025-02-24 RX ADMIN — MAGNESIUM SULFATE HEPTAHYDRATE ONE MLS/HR: 40 INJECTION, SOLUTION INTRAVENOUS at 08:29

## 2025-02-24 RX ADMIN — SODIUM CHLORIDE SCH MLS/HR: 9 INJECTION, SOLUTION INTRAVENOUS at 09:11

## 2025-02-24 NOTE — ERPHSYRPT
- History of Present Illness


Time Seen by Provider: 02/24/25 07:20


Source: patient


Patient Subjective Stated Complaint: Pt. states, "I quit drinking 2 months ago. 

Saturday I drank half a pint of hard alcohol and yesterday morning I started 

vomiting and having intermittent stomach cramping.  I have vomited all night.  

I'm thirsty but when I take a drink, it comes right up."


Triage Nursing Assessment: pt. arrives via ambulance, transfers from cot to bed 

without difficulty.  A&Ox4, Skin P/W/D, Resp. even unlabored, shivering 

uncontrollably but says he isn't cold.  No edema.


Timing/Duration: today


Severity: mild


Associated Symptoms: nausea, vomiting, abdominal pain


Allergies/Adverse Reactions: 








No Known Drug Allergies Allergy (Verified 08/09/23 19:19)


   





Hx Tetanus, Diphtheria Vaccination/Date Given: No


Hx Influenza Vaccination/Date Given: No


Hx Pneumococcal Vaccination/Date Given: No


Immunizations Up to Date: No





Travel Risk





- International Travel


Have you traveled outside of the country in past 3 weeks: No





- Emerging Infectious Disease


Are you exhibiting symptoms associated with any current EIDs: Yes


Symptoms: Abdominal Pain, Vomitting





- Review of Systems


Constitutional: No Symptoms


Eyes: No Symptoms


Ears, Nose, & Throat: No Symptoms


Respiratory: No Symptoms


Cardiac: No Symptoms


Abdominal/Gastrointestinal: Abdominal Pain, Nausea, Vomiting


Genitourinary Symptoms: No Symptoms


Musculoskeletal: No Symptoms





- Past Medical History


Pertinent Past Medical History: No


Neurological History: No Pertinent History


ENT History: No Pertinent History


Cardiac History: No Pertinent History


Respiratory History: No Pertinent History


Endocrine Medical History: No Pertinent History


Musculoskeletal History: No Pertinent History


GI Medical History: No Pertinent History


 History: No Pertinent History


Psycho-Social History: No Pertinent History


Male Reproductive Disorders: No Pertinent History





- Past Surgical History


Past Surgical History: Yes


Neuro Surgical History: No Pertinent History


Cardiac: No Pertinent History


Gastrointestinal: No Pertinent History


Genitourinary: No Pertinent History


Musculoskeletal: Orthopedic Surgery


Male Surgical History: No Pertinent History





- Social History


Smoking Status: Never smoker


Exposure to second hand smoke: No


Drug Use: marijuana





- Social Determinants of Health


Will the patient participate in the screening: Declined to provide





- Nursing Vital Signs


Nursing Vital Signs: 





                               Initial Vital Signs











Temperature  97.2 F   02/24/25 05:53


 


Pulse Rate  111 H  02/24/25 05:53


 


Respiratory Rate  22   02/24/25 05:53


 


Blood Pressure  147/92   02/24/25 05:53


 


O2 Sat by Pulse Oximetry  98   02/24/25 05:53








                                   Pain Scale











Pain Intensity                 0

















- Physical Exam


General Appearance: no apparent distress


Eye Exam: PERRL/EOMI


Ears, Nose, Throat Exam: normal ENT inspection


Neck Exam: normal inspection


Respiratory Exam: normal breath sounds


Cardiovascular Exam: regular rate/rhythm


Gastrointestinal/Abdomen Exam: tenderness (epigastricd tenderness)


SpO2: 96


Ordered Tests: 





                               Active Orders 24 hr











 Category Date Time Status


 


 ABDOMEN AND PELVIS W CONTRAST [CT] Stat Exams  02/24/25 07:22 Completed


 


 AMYLASE Routine Lab  02/24/25 06:49 Completed


 


 CBC W DIFF Stat Lab  02/24/25 06:20 Completed


 


 CMP Stat Lab  02/24/25 06:20 Completed


 


 ETHYL ALCOHOL Routine Lab  02/24/25 06:49 Completed


 


 LIPASE Stat Lab  02/24/25 06:20 Completed


 


 MAGNESIUM Routine Lab  02/24/25 06:49 Completed


 


 Manual Differential NC Stat Lab  02/24/25 06:20 Completed


 


 Urine Triage Profile Stat Lab  02/24/25 07:46 Completed








Medication Summary











Generic Name Dose Route Start Last Admin





  Trade Name Freq  PRN Reason Stop Dose Admin


 


Thiamine HCl 100 mg/  1,000 mls @ 500 mls/hr  02/24/25 07:30  02/24/25 09:11





Multivitamins/Minerals 10 ml/  IV  03/26/25 07:29  500 mls/hr





Folic Acid 1 mg/ Sodium  .Q2H LEONARD   Administration





Chloride   














Discontinued Medications














Generic Name Dose Route Start Last Admin





  Trade Name Freq  PRN Reason Stop Dose Admin


 


Sodium Chloride  1,000 mls @ 999 mls/hr  02/24/25 06:46  02/24/25 08:58





  Sodium Chloride 0.9% 1000 Ml  IV  02/24/25 07:46  Infused





  .Q1H1M STA   Infusion


 


Sodium Chloride  Confirm  02/24/25 06:58 





  Sodium Chloride 0.9% 1000 Ml  Administered  02/24/25 06:59 





  Dose  





  1,000 mls @ ud  





  .ROUTE  





  .STK-MED ONE  


 


Magnesium Sulfate/Water  2 gm in 50 mls @ 100 mls/hr  02/24/25 08:22  02/24/25 

08:59





  Magnesium Sulf 2 G/50 Ml Bag  IV  02/24/25 08:51  Infused





  ONCE ONE   Infusion


 


Magnesium Sulfate/Water  Confirm  02/24/25 08:29 





  Magnesium Sulf 2 G/50 Ml Bag  Administered  02/24/25 08:30 





  Dose  





  2 gm in 50 mls @ ud  





  IV  





  .STK-MED ONE  


 


Ondansetron HCl  4 mg  02/24/25 06:46  02/24/25 07:48





  Ondansetron Hcl 4 Mg/2 Ml Vial  IV  02/24/25 06:47  Not Given





  STAT ONE  


 


Ondansetron HCl  Confirm  02/24/25 06:58 





  Ondansetron Hcl 4 Mg/2 Ml Vial  Administered  02/24/25 06:59 





  Dose  





  4 mg  





  .ROUTE  





  .STK-MED ONE  


 


Prochlorperazine Edisylate  10 mg  02/24/25 07:22  02/24/25 07:41





  Prochlorperazine Edisylate 10 Mg/2 Ml Vial  IV  02/24/25 07:23  10 mg





  STAT ONE   Administration


 


Prochlorperazine Edisylate  Confirm  02/24/25 07:40 





  Prochlorperazine Edisylate 10 Mg/2 Ml Vial  Administered  02/24/25 07:41 





  Dose  





  10 mg  





  .ROUTE  





  .STK-MED ONE  











Lab/Rad Data: 





                           Laboratory Result Diagrams





                                 02/24/25 06:20 





                                 02/24/25 06:20 





                               Laboratory Results











  02/24/25 02/24/25 02/24/25 Range/Units





  07:46 06:49 06:20 


 


WBC     (4.23-9.07)  x10^3/uL


 


RBC     (4.63-6.08)  x10^6/uL


 


Hgb     (13.7-17.5)  g/dL


 


Hct     (40.1-51.0)  %


 


MCV     (79.0-92.2)  fL


 


MCH     (25.7-32.2)  pg


 


MCHC     (32.3-36.5)  g/dL


 


RDW     (11.6-14.4)  %


 


Plt Count     (163-337)  x10^3/uL


 


MPV     (9.4-12.4)  fL


 


Segmented Neutrophils     (34.0-67.9)  %


 


Lymphocytes (Manual)     (21.8-53.1)  %


 


Monocytes (Manual)     (5.3-12.2)  %


 


Toxic Granulation     


 


Platelet Estimate     (NORMAL)  


 


RBC Morphology     


 


Sodium    138  (135-145)  mmol/L


 


Potassium    3.2 L  (3.5-5.1)  mmol/L


 


Chloride    99  ()  mmol/L


 


Carbon Dioxide    20 L  (22-30)  mmol/L


 


Anion Gap    21.7 H  (5-15)  MEQ/L


 


BUN    7 L  (9-20)  mg/dL


 


Creatinine    0.97  (0.66-1.25)  mg/dL


 


Estimated GFR    93.4  ML/MIN


 


Glucose    142 H  ()  mg/dL


 


Calcium    8.4  (8.4-10.2)  mg/dL


 


Magnesium   1.1 L   (1.6-2.3)  mg/dL


 


Total Bilirubin    1.20  (0.2-1.3)  mg/dL


 


AST    143 H  (17-59)  U/L


 


ALT    116 H  (0-50)  U/L


 


Alkaline Phosphatase    88  ()  U/L


 


Serum Total Protein    7.4  (6.3-8.2)  g/dL


 


Albumin    4.2  (3.5-5.0)  g/dL


 


Amylase   187 H  Cancelled  


 


Lipase    82  ()  U/L


 


Urine Opiates Level  NEGATIVE    (NEGATIVE)  


 


Ur Methadone  NEGATIVE    (NEGATIVE)  


 


Urine Barbiturates  NEGATIVE    (NEGATIVE)  


 


Ur Phencyclidine (PCP)  NEGATIVE    (NEGATIVE)  


 


Urine Amphetamine  NEGATIVE    (NEGATIVE)  


 


U Benzodiazepine Level  NEGATIVE    (NEGATIVE)  


 


Urine Cocaine  NEGATIVE    (NEGATIVE)  


 


Urine Marijuana (THC)  POSITIVE A    (NEGATIVE)  


 


Ethyl Alcohol   18 H   (0-10)  mg/dL














  02/24/25 Range/Units





  06:20 


 


WBC  14.0 H  (4.23-9.07)  x10^3/uL


 


RBC  4.95  (4.63-6.08)  x10^6/uL


 


Hgb  15.7  (13.7-17.5)  g/dL


 


Hct  44.7  (40.1-51.0)  %


 


MCV  90.3  (79.0-92.2)  fL


 


MCH  31.7  (25.7-32.2)  pg


 


MCHC  35.1  (32.3-36.5)  g/dL


 


RDW  12.6  (11.6-14.4)  %


 


Plt Count  205  (163-337)  x10^3/uL


 


MPV  12.1  (9.4-12.4)  fL


 


Segmented Neutrophils  76 H  (34.0-67.9)  %


 


Lymphocytes (Manual)  16 L  (21.8-53.1)  %


 


Monocytes (Manual)  8  (5.3-12.2)  %


 


Toxic Granulation  1+  


 


Platelet Estimate  NORMAL  (NORMAL)  


 


RBC Morphology  NORMAL  


 


Sodium   (135-145)  mmol/L


 


Potassium   (3.5-5.1)  mmol/L


 


Chloride   ()  mmol/L


 


Carbon Dioxide   (22-30)  mmol/L


 


Anion Gap   (5-15)  MEQ/L


 


BUN   (9-20)  mg/dL


 


Creatinine   (0.66-1.25)  mg/dL


 


Estimated GFR   ML/MIN


 


Glucose   ()  mg/dL


 


Calcium   (8.4-10.2)  mg/dL


 


Magnesium   (1.6-2.3)  mg/dL


 


Total Bilirubin   (0.2-1.3)  mg/dL


 


AST   (17-59)  U/L


 


ALT   (0-50)  U/L


 


Alkaline Phosphatase   ()  U/L


 


Serum Total Protein   (6.3-8.2)  g/dL


 


Albumin   (3.5-5.0)  g/dL


 


Amylase   


 


Lipase   ()  U/L


 


Urine Opiates Level   (NEGATIVE)  


 


Ur Methadone   (NEGATIVE)  


 


Urine Barbiturates   (NEGATIVE)  


 


Ur Phencyclidine (PCP)   (NEGATIVE)  


 


Urine Amphetamine   (NEGATIVE)  


 


U Benzodiazepine Level   (NEGATIVE)  


 


Urine Cocaine   (NEGATIVE)  


 


Urine Marijuana (THC)   (NEGATIVE)  


 


Ethyl Alcohol   (0-10)  mg/dL














- Progress


Progress Note: 


1








Patient was seen and evaluated for abdominal pain status post alcohol intake CT 

scan of the abdomen pelvis was obtained labs were obtained this revealed











.  New distal esophageal circumferential wall thickening.  Rule out


esophagitis.


2.  Again distended gallbladder without gallstones/biliary distention.


Gallbladder sonogram August 11, 2023 was negative.


3.  New fatty liver.


4.  Chronic findings including left renal cyst, lumbosacral junction


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


02/24/25 09:39





Patient was reevaluated he feels better and wants to go home he was informed of 

the need to follow-up with his primary care provider and gastroenterologist.  He

 is to refrain from drinking alcohol take PPI and will be discharged home with 

antiemetics











Medical Desision Making





- Discussion of managment


Reviewed:: Need for additional workup


Agreed on:: need for follow-up


Will see patient: In office





- Departure


Departure Disposition: Home


Clinical Impression: 


 Transaminitis, Dehydration, Acute alcoholic gastritis, Esophagitis, 

Pancreatitis





Condition: Good


Critical Care Time: No


Referrals: 


DOCTOR,NO FAMILY [Primary Care Provider] - Follow up/PCP as directed


Prescriptions: 


Ondansetron ODT 4 MG*** [Zofran Odt 4 mg***] 4 mg PO Q6H PRN PRN #10 tablet


 PRN Reason: Vomiting


Prochlorperazine 25 mg Supp*** [Compazine 25 Mgsuppository***] 25 mg MD BIDPRN 

PRN #10 supp


 PRN Reason: Nausea


Potassium Chloride Tab* [Klor Con] 20 meq PO DAILY #5 tab

## 2025-02-24 NOTE — XRAY
Indication: Pain.



Multiple contiguous axial images obtained through the abdomen and pelvis using

80 cc Isovue 370 contrast.



Comparison: August 9, 2023



Lung bases demonstrate minimal dependent atelectasis.  No infiltrate or

effusion.  Heart not enlarged.  New distal esophageal circumferential wall

thickening, possible esophagitis.



Noncontrasted stomach and bowel loops nonobstructed again with normal

appendix.  Again distended gallbladder without gallstones or biliary

distention.  Mild diffuse fatty liver.  Incidental 1 cm left mid renal

cortical cyst and hepatic/splenic calcified granulomas.  No free fluid/air.



Remaining liver, gallbladder, pancreas, spleen, adrenal glands, kidneys,

ureters, bladder, and aorta are unremarkable.  No pathologic retroperitoneal

lymphadenopathy.



Osseous structures intact again with lumbosacral junction degenerative disc

disease.  No ventral or inguinal hernias.



Impression:

1.  New distal esophageal circumferential wall thickening.  Rule out

esophagitis.

2.  Again distended gallbladder without gallstones/biliary distention.

Gallbladder sonogram August 11, 2023 was negative.

3.  New fatty liver.

4.  Chronic findings including left renal cyst, lumbosacral junction

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